# Patient Record
Sex: FEMALE | Race: WHITE
[De-identification: names, ages, dates, MRNs, and addresses within clinical notes are randomized per-mention and may not be internally consistent; named-entity substitution may affect disease eponyms.]

---

## 2018-08-29 ENCOUNTER — HOSPITAL ENCOUNTER (EMERGENCY)
Dept: HOSPITAL 25 - ED | Age: 28
Discharge: HOME | End: 2018-08-29
Payer: COMMERCIAL

## 2018-08-29 VITALS — DIASTOLIC BLOOD PRESSURE: 90 MMHG | SYSTOLIC BLOOD PRESSURE: 141 MMHG

## 2018-08-29 DIAGNOSIS — H66.93: ICD-10-CM

## 2018-08-29 DIAGNOSIS — H60.93: Primary | ICD-10-CM

## 2018-08-29 PROCEDURE — 99282 EMERGENCY DEPT VISIT SF MDM: CPT

## 2018-08-29 NOTE — ED
Throat Pain/Nasal Congestion





- HPI Summary


HPI Summary: 





This patient is a 28 year old F presenting to [facility]  with a chief complaint of 

left ear pain for the past two days that has worsened today with bilateral ear 

pain. Pain is 7/10 on triage. She was seen at Urgent Care two days ago and was 

given antibiotic drops; however they are not providing relief. Patient reports 

fever. Denies any medical conditions and reports recent bloodwork. 





- History of Current Complaint


Chief Complaint: EDEarPain


Time Seen by Provider: 08/29/18 10:28


Hx Obtained From: Patient


Onset/Duration: Lasting Days, Worse Since - today


Severity: Moderate





- Allergies/Home Medications


Allergies/Adverse Reactions: 


 Allergies











Allergy/AdvReac Type Severity Reaction Status Date / Time


 


No Known Allergies Allergy   Verified 08/29/18 10:24














PMH/Surg Hx/FS Hx/Imm Hx


Cardiovascular History: 


   Denies: Hx Deep Vein Thrombosis


Respiratory History: 


   Denies: Hx Asthma


EENT History: 


   Denies: Hx Deafness





- Surgical History


Surgery Procedure, Year, and Place: None.


Infectious Disease History: No


Infectious Disease History: 


   Denies: Traveled Outside the [place] in Last 30 Days





- Family History


Known Family History: Positive: Hypertension





- Social History


Alcohol Use: None


Hx Substance Use: No


Substance Use Type: Reports: None


Hx Tobacco Use: No


Smoking Status (MU): Never Smoked Tobacco





Review of Systems


Positive: Fever


Positive: Ear Ache


All Other Systems Reviewed And Are Negative: Yes





Physical Exam





- Summary


Physical Exam Summary: 





Appearance: Well appearing, no pain distress


Skin: warm, dry, reflects adequate perfusion


Head/face: normal


Eyes: EOMI, AURY


ENT: swollen external auditory canal; unable to visualize the tympanic membrane 

b/l 


Neck: supple, non-tender


Respiratory: CTA, breath sounds present


Cardiovascular: RRR, pulses symmetrical  


Abdomen: non-tender, soft


Bowel: present


Musculoskeletal: normal, strength/ROM intact


Neuro: normal, sensory motor intact, A&Ox3





Triage Information Reviewed: Yes


Vital Signs On Initial Exam: 


 Initial Vitals











Temp Pulse Resp BP Pulse Ox


 


 98.3 F   99   18   142/87   98 


 


 08/29/18 10:19  08/29/18 10:19  08/29/18 10:19  08/29/18 10:19  08/29/18 10:19











Vital Signs Reviewed: Yes





Diagnostics





- Vital Signs


 Vital Signs











  Temp Pulse Resp BP Pulse Ox


 


 08/29/18 10:19  98.3 F  99  18  142/87  98














- Laboratory


Lab Statement: Any lab studies that have been ordered have been reviewed, and 

results considered in the medical decision making process.





EENT Course/Dx





- Course


Course Of Treatment: 28 year old F presenting with left ear pain for the past 

two days that has worsened today with bilateral ear pain. She was seen at 

Urgent Care two days ago and was given antibiotic drops; however they are not 

providing relief. Patient is not febrile. Patient is given a prescription for 

PO antibiotics and is instructed to follow up with the ENT physcian referred to 

her on discharge. [place] [facility] [place] 

well. Patient is agreeable with this plan.





- Diagnoses


Provider Diagnoses: 


 Otitis externa, Otitis media








Discharge





- Sign-Out/Discharge


Documenting (check all that apply): Patient Departure - discharge





- Discharge Plan


Condition: Stable


Disposition: HOME


Prescriptions: 


Amoxicillin/Clavulanate TAB* [Augmentin *] 875 mg PO BID #20 tab


Ibuprofen TAB* [Motrin TAB* 600 MG] 600 mg PO Q8H PRN #20 tab MDD 3


 PRN Reason: Pain


Patient Education Materials:  Otitis Externa (ED), Serous Otitis Media (ED)


Referrals: 


Non Staff,Doctor [Medical Doctor] - 


[facility] [place] - 3 Days (Call if you need a primary care 

physician.)


Cryer,Jonathan, MD [Medical Doctor] - 3 Days


Additional Instructions: 


RETURN TO THE EMERGENCY DEPARTMENT FOR CHANGING OR WORSENING SYMPTOMS.





- Billing Disposition and Condition


Condition: STABLE


Disposition: Home





- Attestation Statements


Document Initiated by Scribe: Yes


Documenting Scribe: Capri Castro


Provider For Whom Darrius is Documenting (Include Credential): Colton Lees MD


Scribe Attestation: 


Capri LOZOYA, scribed for Colton Lees MD on 08/29/18 at 1327. 


Scribe Documentation Reviewed: Yes


Provider Attestation: 


The documentation as recorded by the Capri moss accurately reflects 

the service I personally performed and the decisions made by me, Colton Lees MD